# Patient Record
Sex: MALE | Race: BLACK OR AFRICAN AMERICAN | HISPANIC OR LATINO | URBAN - METROPOLITAN AREA
[De-identification: names, ages, dates, MRNs, and addresses within clinical notes are randomized per-mention and may not be internally consistent; named-entity substitution may affect disease eponyms.]

---

## 2024-09-03 ENCOUNTER — APPOINTMENT (RX ONLY)
Dept: URBAN - METROPOLITAN AREA OTHER 6 | Facility: OTHER | Age: 29
Setting detail: DERMATOLOGY
End: 2024-09-03

## 2024-09-03 DIAGNOSIS — L73.8 OTHER SPECIFIED FOLLICULAR DISORDERS: ICD-10-CM

## 2024-09-03 PROCEDURE — ? CONSULTATION FOR COSMETIC REMOVAL

## 2024-09-03 PROCEDURE — ? COUNSELING

## 2024-09-03 ASSESSMENT — LOCATION DETAILED DESCRIPTION DERM: LOCATION DETAILED: RIGHT INFERIOR VERMILION LIP

## 2024-09-03 ASSESSMENT — LOCATION ZONE DERM: LOCATION ZONE: LIP

## 2024-09-03 ASSESSMENT — LOCATION SIMPLE DESCRIPTION DERM: LOCATION SIMPLE: RIGHT LIP

## 2024-09-03 NOTE — HPI: DISCOLORATION
Hold Spironolactone and Losartan for now   Restart after surgery    
How Severe Is Your Skin Discoloration?: moderate
Additional History: Pt states his lower lips feel a bit bumpy. Pt was prescribed acyclovir 400mg (4 tabs po x 10 days) earlier this year and did not notice any improvement after taking the medication.